# Patient Record
Sex: FEMALE | Race: WHITE | URBAN - METROPOLITAN AREA
[De-identification: names, ages, dates, MRNs, and addresses within clinical notes are randomized per-mention and may not be internally consistent; named-entity substitution may affect disease eponyms.]

---

## 2017-08-18 ENCOUNTER — INPATIENT (INPATIENT)
Facility: HOSPITAL | Age: 32
LOS: 1 days | Discharge: HOME | End: 2017-08-20
Attending: OBSTETRICS & GYNECOLOGY | Admitting: OBSTETRICS & GYNECOLOGY
Payer: MEDICAID

## 2017-08-18 ENCOUNTER — EMERGENCY (EMERGENCY)
Facility: HOSPITAL | Age: 32
LOS: 0 days | Discharge: ANOTHER HOSPITAL | End: 2017-08-18
Attending: EMERGENCY MEDICINE
Payer: MEDICAID

## 2017-08-18 VITALS
DIASTOLIC BLOOD PRESSURE: 80 MMHG | RESPIRATION RATE: 16 BRPM | OXYGEN SATURATION: 99 % | HEART RATE: 92 BPM | TEMPERATURE: 98 F | SYSTOLIC BLOOD PRESSURE: 127 MMHG | HEIGHT: 63 IN | WEIGHT: 175.05 LBS

## 2017-08-18 VITALS
DIASTOLIC BLOOD PRESSURE: 67 MMHG | RESPIRATION RATE: 18 BRPM | HEART RATE: 74 BPM | SYSTOLIC BLOOD PRESSURE: 117 MMHG | TEMPERATURE: 98 F

## 2017-08-18 DIAGNOSIS — O26.899 OTHER SPECIFIED PREGNANCY RELATED CONDITIONS, UNSPECIFIED TRIMESTER: ICD-10-CM

## 2017-08-18 DIAGNOSIS — O60.03 PRETERM LABOR WITHOUT DELIVERY, THIRD TRIMESTER: ICD-10-CM

## 2017-08-18 DIAGNOSIS — Z3A.39 39 WEEKS GESTATION OF PREGNANCY: ICD-10-CM

## 2017-08-18 DIAGNOSIS — Z34.80 ENCOUNTER FOR SUPERVISION OF OTHER NORMAL PREGNANCY, UNSPECIFIED TRIMESTER: ICD-10-CM

## 2017-08-18 DIAGNOSIS — Z3A.00 WEEKS OF GESTATION OF PREGNANCY NOT SPECIFIED: ICD-10-CM

## 2017-08-18 DIAGNOSIS — O60.00 PRETERM LABOR WITHOUT DELIVERY, UNSPECIFIED TRIMESTER: ICD-10-CM

## 2017-08-18 LAB
ALBUMIN SERPL ELPH-MCNC: 2.5 G/DL — LOW (ref 3.3–5)
ALP SERPL-CCNC: 202 U/L — HIGH (ref 40–120)
ALT FLD-CCNC: 17 U/L — SIGNIFICANT CHANGE UP (ref 12–78)
ANION GAP SERPL CALC-SCNC: 10 MMOL/L — SIGNIFICANT CHANGE UP (ref 5–17)
APTT BLD: 26.8 SEC — LOW (ref 27.5–37.4)
AST SERPL-CCNC: 30 U/L — SIGNIFICANT CHANGE UP (ref 15–37)
BASOPHILS # BLD AUTO: 0.1 K/UL — SIGNIFICANT CHANGE UP (ref 0–0.2)
BASOPHILS NFR BLD AUTO: 0.4 % — SIGNIFICANT CHANGE UP (ref 0–2)
BILIRUB SERPL-MCNC: 0.3 MG/DL — SIGNIFICANT CHANGE UP (ref 0.2–1.2)
BLD GP AB SCN SERPL QL: NEGATIVE — SIGNIFICANT CHANGE UP
BLD GP AB SCN SERPL QL: SIGNIFICANT CHANGE UP
BUN SERPL-MCNC: 5 MG/DL — LOW (ref 7–23)
CALCIUM SERPL-MCNC: 8.5 MG/DL — SIGNIFICANT CHANGE UP (ref 8.5–10.1)
CHLORIDE SERPL-SCNC: 108 MMOL/L — SIGNIFICANT CHANGE UP (ref 96–108)
CO2 SERPL-SCNC: 22 MMOL/L — SIGNIFICANT CHANGE UP (ref 22–31)
CREAT SERPL-MCNC: 0.56 MG/DL — SIGNIFICANT CHANGE UP (ref 0.5–1.3)
EOSINOPHIL # BLD AUTO: 0 K/UL — SIGNIFICANT CHANGE UP (ref 0–0.5)
EOSINOPHIL NFR BLD AUTO: 0.2 % — SIGNIFICANT CHANGE UP (ref 0–6)
GLUCOSE SERPL-MCNC: 72 MG/DL — SIGNIFICANT CHANGE UP (ref 70–99)
HBV SURFACE AG SERPL QL IA: SIGNIFICANT CHANGE UP
HCT VFR BLD CALC: 38.1 % — SIGNIFICANT CHANGE UP (ref 34.5–45)
HCT VFR BLD CALC: 38.8 % — SIGNIFICANT CHANGE UP (ref 34.5–45)
HGB BLD-MCNC: 12.8 G/DL — SIGNIFICANT CHANGE UP (ref 11.5–15.5)
HGB BLD-MCNC: 13.5 G/DL — SIGNIFICANT CHANGE UP (ref 11.5–15.5)
HIV 1 & 2 AB SERPL IA.RAPID: SIGNIFICANT CHANGE UP
INR BLD: 0.92 RATIO — SIGNIFICANT CHANGE UP (ref 0.88–1.16)
LYMPHOCYTES # BLD AUTO: 1.5 K/UL — SIGNIFICANT CHANGE UP (ref 1–3.3)
LYMPHOCYTES # BLD AUTO: 10.3 % — LOW (ref 13–44)
MCHC RBC-ENTMCNC: 32.7 PG — SIGNIFICANT CHANGE UP (ref 27–34)
MCHC RBC-ENTMCNC: 33.5 PG — SIGNIFICANT CHANGE UP (ref 27–34)
MCHC RBC-ENTMCNC: 33.6 GM/DL — SIGNIFICANT CHANGE UP (ref 32–36)
MCHC RBC-ENTMCNC: 34.7 GM/DL — SIGNIFICANT CHANGE UP (ref 32–36)
MCV RBC AUTO: 96.4 FL — SIGNIFICANT CHANGE UP (ref 80–100)
MCV RBC AUTO: 97.3 FL — SIGNIFICANT CHANGE UP (ref 80–100)
MONOCYTES # BLD AUTO: 0.6 K/UL — SIGNIFICANT CHANGE UP (ref 0–0.9)
MONOCYTES NFR BLD AUTO: 4.3 % — SIGNIFICANT CHANGE UP (ref 2–14)
NEUTROPHILS # BLD AUTO: 12.2 K/UL — HIGH (ref 1.8–7.4)
NEUTROPHILS NFR BLD AUTO: 84.8 % — HIGH (ref 43–77)
PLATELET # BLD AUTO: 199 K/UL — SIGNIFICANT CHANGE UP (ref 150–400)
PLATELET # BLD AUTO: 209 K/UL — SIGNIFICANT CHANGE UP (ref 150–400)
POTASSIUM SERPL-MCNC: 3.4 MMOL/L — LOW (ref 3.5–5.3)
POTASSIUM SERPL-SCNC: 3.4 MMOL/L — LOW (ref 3.5–5.3)
PROT SERPL-MCNC: 6.7 GM/DL — SIGNIFICANT CHANGE UP (ref 6–8.3)
PROTHROM AB SERPL-ACNC: 10 SEC — SIGNIFICANT CHANGE UP (ref 9.8–12.7)
RBC # BLD: 3.92 M/UL — SIGNIFICANT CHANGE UP (ref 3.8–5.2)
RBC # BLD: 4.02 M/UL — SIGNIFICANT CHANGE UP (ref 3.8–5.2)
RBC # FLD: 12.5 % — SIGNIFICANT CHANGE UP (ref 10.3–14.5)
RBC # FLD: 13.3 % — SIGNIFICANT CHANGE UP (ref 11–15)
RH IG SCN BLD-IMP: POSITIVE — SIGNIFICANT CHANGE UP
RH IG SCN BLD-IMP: POSITIVE — SIGNIFICANT CHANGE UP
RUBV IGG SER-ACNC: 6.3 INDEX — SIGNIFICANT CHANGE UP
RUBV IGG SER-IMP: POSITIVE — SIGNIFICANT CHANGE UP
SODIUM SERPL-SCNC: 140 MMOL/L — SIGNIFICANT CHANGE UP (ref 135–145)
T PALLIDUM AB TITR SER: NEGATIVE — SIGNIFICANT CHANGE UP
WBC # BLD: 14.4 K/UL — HIGH (ref 3.8–10.5)
WBC # BLD: 9.3 K/UL — SIGNIFICANT CHANGE UP (ref 3.8–10.5)
WBC # FLD AUTO: 14.4 K/UL — HIGH (ref 3.8–10.5)
WBC # FLD AUTO: 9.3 K/UL — SIGNIFICANT CHANGE UP (ref 3.8–10.5)

## 2017-08-18 PROCEDURE — 99284 EMERGENCY DEPT VISIT MOD MDM: CPT | Mod: 25

## 2017-08-18 PROCEDURE — 59409 OBSTETRICAL CARE: CPT | Mod: U9

## 2017-08-18 RX ORDER — ACETAMINOPHEN 500 MG
975 TABLET ORAL EVERY 6 HOURS
Qty: 0 | Refills: 0 | Status: COMPLETED | OUTPATIENT
Start: 2017-08-18 | End: 2018-07-17

## 2017-08-18 RX ORDER — OXYTOCIN 10 UNIT/ML
41.67 VIAL (ML) INJECTION
Qty: 20 | Refills: 0 | Status: DISCONTINUED | OUTPATIENT
Start: 2017-08-18 | End: 2017-08-20

## 2017-08-18 RX ORDER — AER TRAVELER 0.5 G/1
1 SOLUTION RECTAL; TOPICAL EVERY 4 HOURS
Qty: 0 | Refills: 0 | Status: DISCONTINUED | OUTPATIENT
Start: 2017-08-18 | End: 2017-08-18

## 2017-08-18 RX ORDER — SODIUM CHLORIDE 9 MG/ML
3 INJECTION INTRAMUSCULAR; INTRAVENOUS; SUBCUTANEOUS EVERY 8 HOURS
Qty: 0 | Refills: 0 | Status: DISCONTINUED | OUTPATIENT
Start: 2017-08-18 | End: 2017-08-20

## 2017-08-18 RX ORDER — ACETAMINOPHEN 500 MG
975 TABLET ORAL EVERY 6 HOURS
Qty: 0 | Refills: 0 | Status: DISCONTINUED | OUTPATIENT
Start: 2017-08-18 | End: 2017-08-20

## 2017-08-18 RX ORDER — OXYCODONE HYDROCHLORIDE 5 MG/1
5 TABLET ORAL
Qty: 0 | Refills: 0 | Status: DISCONTINUED | OUTPATIENT
Start: 2017-08-18 | End: 2017-08-20

## 2017-08-18 RX ORDER — PRAMOXINE HYDROCHLORIDE 150 MG/15G
1 AEROSOL, FOAM RECTAL EVERY 4 HOURS
Qty: 0 | Refills: 0 | Status: DISCONTINUED | OUTPATIENT
Start: 2017-08-18 | End: 2017-08-20

## 2017-08-18 RX ORDER — SODIUM CHLORIDE 9 MG/ML
1000 INJECTION, SOLUTION INTRAVENOUS
Qty: 0 | Refills: 0 | Status: DISCONTINUED | OUTPATIENT
Start: 2017-08-18 | End: 2017-08-18

## 2017-08-18 RX ORDER — CITRIC ACID/SODIUM CITRATE 300-500 MG
15 SOLUTION, ORAL ORAL EVERY 4 HOURS
Qty: 0 | Refills: 0 | Status: DISCONTINUED | OUTPATIENT
Start: 2017-08-18 | End: 2017-08-18

## 2017-08-18 RX ORDER — HYDROCORTISONE 1 %
1 OINTMENT (GRAM) TOPICAL EVERY 4 HOURS
Qty: 0 | Refills: 0 | Status: DISCONTINUED | OUTPATIENT
Start: 2017-08-18 | End: 2017-08-18

## 2017-08-18 RX ORDER — MAGNESIUM HYDROXIDE 400 MG/1
30 TABLET, CHEWABLE ORAL
Qty: 0 | Refills: 0 | Status: DISCONTINUED | OUTPATIENT
Start: 2017-08-18 | End: 2017-08-20

## 2017-08-18 RX ORDER — DIBUCAINE 1 %
1 OINTMENT (GRAM) RECTAL EVERY 4 HOURS
Qty: 0 | Refills: 0 | Status: DISCONTINUED | OUTPATIENT
Start: 2017-08-18 | End: 2017-08-20

## 2017-08-18 RX ORDER — HYDROCORTISONE 1 %
1 OINTMENT (GRAM) TOPICAL EVERY 4 HOURS
Qty: 0 | Refills: 0 | Status: DISCONTINUED | OUTPATIENT
Start: 2017-08-18 | End: 2017-08-20

## 2017-08-18 RX ORDER — GLYCERIN ADULT
1 SUPPOSITORY, RECTAL RECTAL AT BEDTIME
Qty: 0 | Refills: 0 | Status: DISCONTINUED | OUTPATIENT
Start: 2017-08-18 | End: 2017-08-20

## 2017-08-18 RX ORDER — TETANUS TOXOID, REDUCED DIPHTHERIA TOXOID AND ACELLULAR PERTUSSIS VACCINE, ADSORBED 5; 2.5; 8; 8; 2.5 [IU]/.5ML; [IU]/.5ML; UG/.5ML; UG/.5ML; UG/.5ML
0.5 SUSPENSION INTRAMUSCULAR ONCE
Qty: 0 | Refills: 0 | Status: DISCONTINUED | OUTPATIENT
Start: 2017-08-18 | End: 2017-08-20

## 2017-08-18 RX ORDER — IBUPROFEN 200 MG
600 TABLET ORAL EVERY 6 HOURS
Qty: 0 | Refills: 0 | Status: COMPLETED | OUTPATIENT
Start: 2017-08-18 | End: 2018-07-17

## 2017-08-18 RX ORDER — SIMETHICONE 80 MG/1
80 TABLET, CHEWABLE ORAL EVERY 6 HOURS
Qty: 0 | Refills: 0 | Status: DISCONTINUED | OUTPATIENT
Start: 2017-08-18 | End: 2017-08-20

## 2017-08-18 RX ORDER — OXYTOCIN 10 UNIT/ML
333.33 VIAL (ML) INJECTION
Qty: 20 | Refills: 0 | Status: COMPLETED | OUTPATIENT
Start: 2017-08-18

## 2017-08-18 RX ORDER — AER TRAVELER 0.5 G/1
1 SOLUTION RECTAL; TOPICAL EVERY 4 HOURS
Qty: 0 | Refills: 0 | Status: DISCONTINUED | OUTPATIENT
Start: 2017-08-18 | End: 2017-08-20

## 2017-08-18 RX ORDER — DOCUSATE SODIUM 100 MG
100 CAPSULE ORAL
Qty: 0 | Refills: 0 | Status: DISCONTINUED | OUTPATIENT
Start: 2017-08-18 | End: 2017-08-20

## 2017-08-18 RX ORDER — KETOROLAC TROMETHAMINE 30 MG/ML
30 SYRINGE (ML) INJECTION ONCE
Qty: 0 | Refills: 0 | Status: DISCONTINUED | OUTPATIENT
Start: 2017-08-18 | End: 2017-08-18

## 2017-08-18 RX ORDER — OXYCODONE HYDROCHLORIDE 5 MG/1
5 TABLET ORAL EVERY 4 HOURS
Qty: 0 | Refills: 0 | Status: DISCONTINUED | OUTPATIENT
Start: 2017-08-18 | End: 2017-08-20

## 2017-08-18 RX ORDER — DIBUCAINE 1 %
1 OINTMENT (GRAM) RECTAL EVERY 4 HOURS
Qty: 0 | Refills: 0 | Status: DISCONTINUED | OUTPATIENT
Start: 2017-08-18 | End: 2017-08-18

## 2017-08-18 RX ORDER — IBUPROFEN 200 MG
1 TABLET ORAL
Qty: 0 | Refills: 0 | COMMUNITY
Start: 2017-08-18

## 2017-08-18 RX ORDER — LANOLIN
1 OINTMENT (GRAM) TOPICAL EVERY 6 HOURS
Qty: 0 | Refills: 0 | Status: DISCONTINUED | OUTPATIENT
Start: 2017-08-18 | End: 2017-08-20

## 2017-08-18 RX ORDER — IBUPROFEN 200 MG
600 TABLET ORAL EVERY 6 HOURS
Qty: 0 | Refills: 0 | Status: DISCONTINUED | OUTPATIENT
Start: 2017-08-18 | End: 2017-08-20

## 2017-08-18 RX ORDER — OXYTOCIN 10 UNIT/ML
333.33 VIAL (ML) INJECTION
Qty: 20 | Refills: 0 | Status: COMPLETED | OUTPATIENT
Start: 2017-08-18 | End: 2017-08-18

## 2017-08-18 RX ORDER — SODIUM CHLORIDE 9 MG/ML
3 INJECTION INTRAMUSCULAR; INTRAVENOUS; SUBCUTANEOUS EVERY 8 HOURS
Qty: 0 | Refills: 0 | Status: DISCONTINUED | OUTPATIENT
Start: 2017-08-18 | End: 2017-08-18

## 2017-08-18 RX ORDER — SODIUM CHLORIDE 9 MG/ML
1000 INJECTION, SOLUTION INTRAVENOUS ONCE
Qty: 0 | Refills: 0 | Status: DISCONTINUED | OUTPATIENT
Start: 2017-08-18 | End: 2017-08-18

## 2017-08-18 RX ORDER — DIPHENHYDRAMINE HCL 50 MG
25 CAPSULE ORAL EVERY 6 HOURS
Qty: 0 | Refills: 0 | Status: DISCONTINUED | OUTPATIENT
Start: 2017-08-18 | End: 2017-08-20

## 2017-08-18 RX ADMIN — Medication 975 MILLIGRAM(S): at 11:00

## 2017-08-18 RX ADMIN — Medication 1 TABLET(S): at 16:12

## 2017-08-18 RX ADMIN — Medication 1000 MILLIUNIT(S)/MIN: at 07:44

## 2017-08-18 RX ADMIN — Medication 30 MILLIGRAM(S): at 07:44

## 2017-08-18 RX ADMIN — Medication 600 MILLIGRAM(S): at 16:45

## 2017-08-18 RX ADMIN — Medication 975 MILLIGRAM(S): at 10:34

## 2017-08-18 RX ADMIN — Medication 600 MILLIGRAM(S): at 16:12

## 2017-08-18 NOTE — DISCHARGE NOTE OB - MEDICATION SUMMARY - MEDICATIONS TO TAKE
I will START or STAY ON the medications listed below when I get home from the hospital:    ibuprofen 600 mg oral tablet  -- 1 tab(s) by mouth every 6 hours  -- Indication: For Pain as needed    Prenatal Multivitamins with Folic Acid 1 mg oral bar  --  by mouth   -- Indication: For Vitamin Supplement

## 2017-08-18 NOTE — DISCHARGE NOTE OB - MATERIALS PROVIDED
Guide to Postpartum Care/[facility] Hearing Screen Program/Breastfeeding Log/Breastfeeding Mother’s Support Group Information/  Immunization Record/Birth Certificate Instructions/Breastfeeding Guide and Packet/[facility]  Screening Program

## 2017-08-18 NOTE — DISCHARGE NOTE OB - PATIENT PORTAL LINK FT
“You can access the [facility] Patient Portal, offered by [facility], by registering with the following website: [email or web address]”

## 2017-08-18 NOTE — ED ADULT NURSE NOTE - OBJECTIVE STATEMENT
Pt is 9 months pregnant and having contractions as well as vaginal bleeding. pt states contractions 5 minutes apart, REYNA 17.

## 2017-08-18 NOTE — DISCHARGE NOTE OB - CARE PLAN
Principal Discharge DX:	Vaginal delivery  Goal:	Follow up with your obstetrician for routine prenatal care in 6 weeks  Instructions for follow-up, activity and diet:	Continue your regular diet  Resume activity as tolerated.  Nothing in the vagina for 6 weeks (no intercourse, no tampons) Principal Discharge DX:	Vaginal delivery  Goal:	Follow up with your obstetrician for routine postpartum care in 6 weeks  Instructions for follow-up, activity and diet:	Continue your regular diet  Resume activity as tolerated.  Nothing in the vagina for 6 weeks (no intercourse, no tampons)

## 2017-08-18 NOTE — ED PROVIDER NOTE - OBJECTIVE STATEMENT
Pertinent PMH/PSH/FHx/SHx and Review of Systems contained within:  32 yo 39 weeks gestation f with no PMH presents c/o contractions every 5 minutes that began at 3am. no other associations. No aggravating or relieving factors. No fever/chills, No photophobia/eye pain/changes in vision, No ear pain/sore throat/dysphagia, No chest pain/palpitations, no SOB/cough/wheeze/stridor, No abdominal pain, No N/V/D, no dysuria/frequency/discharge, No neck/back pain, no rash, no changes in neurological status/function.

## 2017-08-18 NOTE — DISCHARGE NOTE OB - HOSPITAL COURSE
32 y/o  at 37.4 weeks GA transferred from [place] for labor on 17.   Patient had an uncomplicated  on 17.  Please see delivery note for details.    Patient had a viable male infant, weight 3915 grams, APGARS 9/9.    Prenatal labs were sent.  Blood type: O positive.  Rubella immune.   RPR negative.   HIV 1/2 antibody nonreactive.    Hb/Hct: 13.5/38.8    Postpartum course was uncomplicated.   Patient instructed to follow up with her OB in [facility] in 6 weeks. 32 y/o  at 37.4 weeks GA transferred from [place] for labor on 17.   Patient had an uncomplicated  on 17.  Please see delivery note for details.    Patient had a viable male infant, weight 3915 grams, APGARS 9/9.    Prenatal labs were sent.  Blood type: O positive.  Rubella immune.   RPR negative.   HIV 1/2 antibody nonreactive.  Hb/Hct: 13.5/38.8    Postpartum course was uncomplicated.   Contraception counseling provided and patient opted for Depo Provera.  Patient instructed to follow up with her OB in [facility] in 6 weeks.

## 2017-08-18 NOTE — DISCHARGE NOTE OB - PROVIDER TOKENS
FREE:[LAST:[[facility]],PHONE:[phone]],FAX:[(   [phone]    -],ADDRESS:[[address] [place] [address], [address] [place] [address]]]

## 2017-08-18 NOTE — DISCHARGE NOTE OB - PLAN OF CARE
Follow up with your obstetrician for routine prenatal care in 6 weeks Continue your regular diet  Resume activity as tolerated.  Nothing in the vagina for 6 weeks (no intercourse, no tampons) Follow up with your obstetrician for routine postpartum care in 6 weeks

## 2017-08-18 NOTE — DISCHARGE NOTE OB - CARE PROVIDER_API CALL
[facility],   [address] [place] [address], 2nd floor  [place], [place]  Phone: [phone]  Fax: (   )    -

## 2017-08-19 LAB
HCT VFR BLD CALC: 33.9 % — LOW (ref 34.5–45)
HGB BLD-MCNC: 11.3 G/DL — LOW (ref 11.5–15.5)

## 2017-08-19 RX ADMIN — Medication 600 MILLIGRAM(S): at 12:11

## 2017-08-19 RX ADMIN — Medication 600 MILLIGRAM(S): at 12:40

## 2017-08-19 RX ADMIN — Medication 600 MILLIGRAM(S): at 05:57

## 2017-08-19 RX ADMIN — Medication 600 MILLIGRAM(S): at 06:25

## 2017-08-19 RX ADMIN — Medication 600 MILLIGRAM(S): at 18:45

## 2017-08-19 RX ADMIN — MAGNESIUM HYDROXIDE 30 MILLILITER(S): 400 TABLET, CHEWABLE ORAL at 05:54

## 2017-08-19 RX ADMIN — Medication 600 MILLIGRAM(S): at 18:12

## 2017-08-19 RX ADMIN — Medication 1 TABLET(S): at 12:11

## 2017-08-19 NOTE — PROGRESS NOTE ADULT - SUBJECTIVE AND OBJECTIVE BOX
Patient seen and examined at bedside, no acute overnight events. No acute complaints, pain well controlled. Patient is ambulating, voiding spontaneously, passing gas, and tolerating regular diet.    Vital Signs Last 24 Hours  T(C): 36.4 (08-19-17 @ 06:26), Max: 36.9 (08-18-17 @ 10:20)  HR: 71 (08-19-17 @ 06:26) (61 - 72)  BP: 95/60 (08-19-17 @ 06:26) (95/60 - 102/62)  RR: 18 (08-19-17 @ 06:26) (18 - 18)  SpO2: 98% (08-18-17 @ 21:31) (98% - 98%)    Physical Exam:  General: NAD  Abdomen: Soft, non-tender, non-distended, fundus firm  Pelvic: Lochia wnl    Labs:    Blood Type: O Positive  Rubella IgG: Positive (Positive=Immune, Negative=Non-Immune)  RPR: Negative               11.3   x     )-----------( x        ( 08-19 @ 08:33 )             33.9                13.5   14.4  )-----------( 209      ( 08-18 @ 08:24 )             38.8                12.8   9.3   )-----------( 199      ( 08-18 @ 05:18 )             38.1         MEDICATIONS  (STANDING):  sodium chloride 0.9% lock flush 3 milliLiter(s) IV Push every 8 hours  oxytocin Infusion 41.667 milliUNIT(s)/Min (125 mL/Hr) IV Continuous <Continuous>  diphtheria/tetanus/pertussis (acellular) Vaccine (ADAcel) 0.5 milliLiter(s) IntraMuscular once  oxytocin Infusion 41.667 milliUNIT(s)/Min (125 mL/Hr) IV Continuous <Continuous>  prenatal multivitamin 1 Tablet(s) Oral daily  oxyCODONE    IR 5 milliGRAM(s) Oral every 3 hours  acetaminophen   Tablet. 975 milliGRAM(s) Oral every 6 hours  ibuprofen  Tablet 600 milliGRAM(s) Oral every 6 hours    MEDICATIONS  (PRN):  witch hazel Pads 1 Application(s) Topical every 4 hours PRN Perineal discomfort  hydrocortisone 1% Cream 1 Application(s) Topical every 4 hours PRN Moderate to Severe Perineal Pain  pramoxine 1%/zinc 5% Cream 1 Application(s) Topical every 4 hours PRN Moderate to Severe Perineal Pain  pramoxine 1%/zinc 5% Cream 1 Application(s) Topical every 4 hours PRN Moderate to Severe Perineal Pain  dibucaine 1% Ointment 1 Application(s) Topical every 4 hours PRN Perineal Discomfort  lanolin Ointment 1 Application(s) Topical every 6 hours PRN Sore Nipples  simethicone 80 milliGRAM(s) Chew every 6 hours PRN Gas  diphenhydrAMINE   Capsule 25 milliGRAM(s) Oral every 6 hours PRN Itching  glycerin Suppository - Adult 1 Suppository(s) Rectal at bedtime PRN Constipation  magnesium hydroxide Suspension 30 milliLiter(s) Oral two times a day PRN Constipation  docusate sodium 100 milliGRAM(s) Oral two times a day PRN Stool Softening  oxyCODONE    IR 5 milliGRAM(s) Oral every 4 hours PRN Severe Pain (7 -10)

## 2017-08-20 VITALS
DIASTOLIC BLOOD PRESSURE: 64 MMHG | SYSTOLIC BLOOD PRESSURE: 110 MMHG | HEART RATE: 68 BPM | TEMPERATURE: 98 F | RESPIRATION RATE: 18 BRPM

## 2017-08-20 PROCEDURE — 86780 TREPONEMA PALLIDUM: CPT

## 2017-08-20 PROCEDURE — 59050 FETAL MONITOR W/REPORT: CPT

## 2017-08-20 PROCEDURE — 85018 HEMOGLOBIN: CPT

## 2017-08-20 PROCEDURE — 85027 COMPLETE CBC AUTOMATED: CPT

## 2017-08-20 PROCEDURE — 59025 FETAL NON-STRESS TEST: CPT

## 2017-08-20 PROCEDURE — 86901 BLOOD TYPING SEROLOGIC RH(D): CPT

## 2017-08-20 PROCEDURE — 86703 HIV-1/HIV-2 1 RESULT ANTBDY: CPT

## 2017-08-20 PROCEDURE — 86762 RUBELLA ANTIBODY: CPT

## 2017-08-20 PROCEDURE — G0463: CPT

## 2017-08-20 PROCEDURE — 86900 BLOOD TYPING SEROLOGIC ABO: CPT

## 2017-08-20 PROCEDURE — 86850 RBC ANTIBODY SCREEN: CPT

## 2017-08-20 PROCEDURE — 87340 HEPATITIS B SURFACE AG IA: CPT

## 2017-08-20 RX ORDER — MEDROXYPROGESTERONE ACETATE 150 MG/ML
150 INJECTION, SUSPENSION, EXTENDED RELEASE INTRAMUSCULAR ONCE
Qty: 0 | Refills: 0 | Status: COMPLETED | OUTPATIENT
Start: 2017-08-20 | End: 2017-08-20

## 2017-08-20 RX ORDER — ACETAMINOPHEN 500 MG
975 TABLET ORAL
Qty: 0 | Refills: 0 | COMMUNITY

## 2017-08-20 RX ADMIN — Medication 1 TABLET(S): at 12:06

## 2017-08-20 RX ADMIN — MEDROXYPROGESTERONE ACETATE 150 MILLIGRAM(S): 150 INJECTION, SUSPENSION, EXTENDED RELEASE INTRAMUSCULAR at 12:07

## 2017-08-20 NOTE — PROGRESS NOTE ADULT - PROBLEM SELECTOR PLAN 1
- Continue with po analgesia PRN  - Increase ambulation  - Continue regular diet  - IV lock  -likely DC today

## 2017-08-20 NOTE — PROGRESS NOTE ADULT - SUBJECTIVE AND OBJECTIVE BOX
Patient seen and examined at bedside, no acute overnight events. No acute complaints, pain well controlled. Patient is ambulating, voiding spontaneously, passing flatus, and tolerating regular diet.     Vital Signs Last 24 Hours  T(C): 36.5 (08-19-17 @ 16:59), Max: 36.5 (08-19-17 @ 16:59)  HR: 64 (08-19-17 @ 16:59) (64 - 71)  BP: 111/72 (08-19-17 @ 16:59) (95/60 - 111/72)  RR: 18 (08-19-17 @ 16:59) (18 - 18)  SpO2: --    Physical exam:  General: NAD  Abdomen: Soft, non-tender, non-distended, fundus firm  Pelvic: Lochia wnl    Labs:    Blood Type: O Positive  Rubella IgG: Positive (Positive=Immune, Negative=Non-Immune)  RPR: Negative               11.3   x     )-----------( x        ( 08-19 @ 08:33 )             33.9                13.5   14.4  )-----------( 209      ( 08-18 @ 08:24 )             38.8                12.8   9.3   )-----------( 199      ( 08-18 @ 05:18 )             38.1         MEDICATIONS  (STANDING):  sodium chloride 0.9% lock flush 3 milliLiter(s) IV Push every 8 hours  oxytocin Infusion 41.667 milliUNIT(s)/Min (125 mL/Hr) IV Continuous <Continuous>  diphtheria/tetanus/pertussis (acellular) Vaccine (ADAcel) 0.5 milliLiter(s) IntraMuscular once  oxytocin Infusion 41.667 milliUNIT(s)/Min (125 mL/Hr) IV Continuous <Continuous>  prenatal multivitamin 1 Tablet(s) Oral daily  oxyCODONE    IR 5 milliGRAM(s) Oral every 3 hours  acetaminophen   Tablet. 975 milliGRAM(s) Oral every 6 hours  ibuprofen  Tablet 600 milliGRAM(s) Oral every 6 hours    MEDICATIONS  (PRN):  witch hazel Pads 1 Application(s) Topical every 4 hours PRN Perineal discomfort  hydrocortisone 1% Cream 1 Application(s) Topical every 4 hours PRN Moderate to Severe Perineal Pain  pramoxine 1%/zinc 5% Cream 1 Application(s) Topical every 4 hours PRN Moderate to Severe Perineal Pain  pramoxine 1%/zinc 5% Cream 1 Application(s) Topical every 4 hours PRN Moderate to Severe Perineal Pain  dibucaine 1% Ointment 1 Application(s) Topical every 4 hours PRN Perineal Discomfort  lanolin Ointment 1 Application(s) Topical every 6 hours PRN Sore Nipples  simethicone 80 milliGRAM(s) Chew every 6 hours PRN Gas  diphenhydrAMINE   Capsule 25 milliGRAM(s) Oral every 6 hours PRN Itching  glycerin Suppository - Adult 1 Suppository(s) Rectal at bedtime PRN Constipation  magnesium hydroxide Suspension 30 milliLiter(s) Oral two times a day PRN Constipation  docusate sodium 100 milliGRAM(s) Oral two times a day PRN Stool Softening  oxyCODONE    IR 5 milliGRAM(s) Oral every 4 hours PRN Severe Pain (7 -10)

## 2017-09-18 NOTE — ED PROVIDER NOTE - TEMPLATE, MLM
Call received from [facility] from [facility]:     - requesting us to fax pt's current med list, allergy list & diagnosis list  -  [phone], fax [phone]    Please fax the records to home care as requested. Thanks.    Tiera, RN  Triage Nurse           General